# Patient Record
Sex: FEMALE | Race: BLACK OR AFRICAN AMERICAN | NOT HISPANIC OR LATINO | Employment: FULL TIME | URBAN - METROPOLITAN AREA
[De-identification: names, ages, dates, MRNs, and addresses within clinical notes are randomized per-mention and may not be internally consistent; named-entity substitution may affect disease eponyms.]

---

## 2020-03-29 ENCOUNTER — OFFICE VISIT (OUTPATIENT)
Dept: URGENT CARE | Facility: CLINIC | Age: 55
End: 2020-03-29
Payer: COMMERCIAL

## 2020-03-29 VITALS
HEART RATE: 106 BPM | DIASTOLIC BLOOD PRESSURE: 67 MMHG | WEIGHT: 239.31 LBS | BODY MASS INDEX: 39.87 KG/M2 | SYSTOLIC BLOOD PRESSURE: 128 MMHG | RESPIRATION RATE: 18 BRPM | HEIGHT: 65 IN | TEMPERATURE: 98 F | OXYGEN SATURATION: 96 %

## 2020-03-29 DIAGNOSIS — N64.59 BREAST COMPLAINT: ICD-10-CM

## 2020-03-29 DIAGNOSIS — E11.9 TYPE 2 DIABETES MELLITUS WITHOUT COMPLICATION, UNSPECIFIED WHETHER LONG TERM INSULIN USE: Primary | ICD-10-CM

## 2020-03-29 LAB — GLUCOSE SERPL-MCNC: 171 MG/DL (ref 70–110)

## 2020-03-29 PROCEDURE — 82962 GLUCOSE BLOOD TEST: CPT | Mod: S$GLB,,, | Performed by: NURSE PRACTITIONER

## 2020-03-29 PROCEDURE — 82962 POCT GLUCOSE, HAND-HELD DEVICE: ICD-10-PCS | Mod: S$GLB,,, | Performed by: NURSE PRACTITIONER

## 2020-03-29 PROCEDURE — 99203 PR OFFICE/OUTPT VISIT, NEW, LEVL III, 30-44 MIN: ICD-10-PCS | Mod: S$GLB,,, | Performed by: NURSE PRACTITIONER

## 2020-03-29 PROCEDURE — 99203 OFFICE O/P NEW LOW 30 MIN: CPT | Mod: S$GLB,,, | Performed by: NURSE PRACTITIONER

## 2020-03-29 RX ORDER — FLUTICASONE PROPIONATE 50 UG/1
POWDER, METERED RESPIRATORY (INHALATION)
COMMUNITY

## 2020-03-29 RX ORDER — LEVOTHYROXINE SODIUM 25 UG/1
25 TABLET ORAL
COMMUNITY

## 2020-03-29 RX ORDER — CETIRIZINE HYDROCHLORIDE 10 MG/1
10 TABLET ORAL DAILY
COMMUNITY

## 2020-03-29 RX ORDER — AMLODIPINE BESYLATE 5 MG/1
5 TABLET ORAL DAILY
COMMUNITY

## 2020-03-29 RX ORDER — METOPROLOL SUCCINATE 50 MG/1
50 TABLET, EXTENDED RELEASE ORAL DAILY
COMMUNITY

## 2020-03-29 RX ORDER — METFORMIN HYDROCHLORIDE EXTENDED-RELEASE TABLETS 1000 MG/1
1000 TABLET, FILM COATED, EXTENDED RELEASE ORAL
COMMUNITY

## 2020-03-29 RX ORDER — AMOXICILLIN AND CLAVULANATE POTASSIUM 562.5; 437.5; 62.5 MG/1; MG/1; MG/1
2 TABLET, FILM COATED, EXTENDED RELEASE ORAL
COMMUNITY

## 2020-03-29 NOTE — PROGRESS NOTES
"Subjective:       Patient ID: Stephany Garcia is a 54 y.o. female.    Vitals:  height is 5' 5" (1.651 m) and weight is 108.6 kg (239 lb 5 oz). Her temperature is 98.4 °F (36.9 °C). Her blood pressure is 128/67 and her pulse is 106. Her respiration is 18 and oxygen saturation is 96%.     Chief Complaint: Diabetes Mellitus    Pt w/ c/o elevated blood sugars for the past few days. Also c/o having redness on both breasts. Denies breast pain, warmth and tenderness to touch and nipple discharge. Also denies fever, chills and body aches.    Other   This is a new problem. The current episode started in the past 7 days (2 DAYS ). The problem occurs constantly. The problem has been unchanged. Associated symptoms include headaches and nausea. Pertinent negatives include no arthralgias, chest pain, chills, congestion, coughing, fatigue, fever, joint swelling, myalgias, rash, sore throat, vertigo, vomiting or weakness. Nothing aggravates the symptoms. Treatments tried: METFORMIN  The treatment provided mild relief.   Diabetes   Hypoglycemia symptoms include headaches. Pertinent negatives for hypoglycemia include no dizziness, nervousness/anxiousness or pallor. Pertinent negatives for diabetes include no blurred vision, no chest pain, no fatigue and no weakness.       Constitution: Positive for appetite change. Negative for chills, fatigue and fever.   HENT: Negative for congestion and sore throat.    Neck: Negative for painful lymph nodes.   Cardiovascular: Negative for chest pain and leg swelling.   Eyes: Negative for double vision and blurred vision.   Respiratory: Negative for cough and shortness of breath.    Gastrointestinal: Positive for nausea. Negative for vomiting and diarrhea.   Genitourinary: Negative for dysuria, frequency, urgency and history of kidney stones.   Musculoskeletal: Negative for joint pain, joint swelling, muscle cramps and muscle ache.   Skin: Negative for color change, pale, rash, erythema and bruising. "   Allergic/Immunologic: Negative for seasonal allergies.   Neurological: Positive for headaches. Negative for dizziness, history of vertigo, light-headedness and passing out.   Hematologic/Lymphatic: Negative for swollen lymph nodes.   Psychiatric/Behavioral: Negative for nervous/anxious, sleep disturbance and depression. The patient is not nervous/anxious.        Objective:      Physical Exam   Constitutional: She is oriented to person, place, and time. She appears well-developed and well-nourished. She is cooperative.   HENT:   Head: Normocephalic and atraumatic. Head is without abrasion, without contusion and without laceration.   Right Ear: External ear normal.   Left Ear: External ear normal.   Nose: Nose normal.   Mouth/Throat: Oropharynx is clear and moist and mucous membranes are normal.   Eyes: Pupils are equal, round, and reactive to light. Conjunctivae, EOM and lids are normal.   Neck: Trachea normal, full passive range of motion without pain and phonation normal. Neck supple.   Cardiovascular: Normal rate, regular rhythm and normal heart sounds.   Pulmonary/Chest: Effort normal and breath sounds normal. No stridor. No respiratory distress. She exhibits no tenderness and no deformity. Right breast exhibits no mass and no tenderness. Left breast exhibits no mass and no tenderness. Breasts are symmetrical.       Musculoskeletal: Normal range of motion.   Neurological: She is alert and oriented to person, place, and time.   Skin: Skin is warm, dry, intact and no rash. Capillary refill takes less than 2 seconds. abrasion, burn, bruising, erythema and ecchymosis  Psychiatric: She has a normal mood and affect. Her speech is normal and behavior is normal. Judgment and thought content normal. Cognition and memory are normal.   Nursing note and vitals reviewed.        Assessment:       1. Type 2 diabetes mellitus without complication, unspecified whether long term insulin use    2. Breast complaint        Plan:          Type 2 diabetes mellitus without complication, unspecified whether long term insulin use  -     POCT Glucose, Hand-Held Device    Breast complaint         Results for orders placed or performed in visit on 03/29/20   POCT Glucose, Hand-Held Device   Result Value Ref Range    POC Glucose 171 (A) 70 - 110 MG/DL     Lab results reviewed and discussed with patient    Follow up with your PCP for further management of diabetes.  Continue to take all your medications as directed by your PCP.  Keep a log of your blood sugars and meals.  Continue to do monthly self breast exams.

## 2020-03-29 NOTE — PATIENT INSTRUCTIONS
Follow up with your PCP tomorrow for further management of diabetes.  Continue to take all your medications as directed by your PCP.  Keep a log of your blood sugars and meals.  Continue to do monthly self breast exams.

## 2020-03-31 ENCOUNTER — TELEPHONE (OUTPATIENT)
Dept: URGENT CARE | Facility: CLINIC | Age: 55
End: 2020-03-31